# Patient Record
Sex: FEMALE
[De-identification: names, ages, dates, MRNs, and addresses within clinical notes are randomized per-mention and may not be internally consistent; named-entity substitution may affect disease eponyms.]

---

## 2020-02-11 PROBLEM — Z00.00 ENCOUNTER FOR PREVENTIVE HEALTH EXAMINATION: Status: ACTIVE | Noted: 2020-02-11

## 2020-02-18 ENCOUNTER — APPOINTMENT (OUTPATIENT)
Dept: HEART AND VASCULAR | Facility: CLINIC | Age: 75
End: 2020-02-18
Payer: MEDICARE

## 2020-02-18 VITALS
WEIGHT: 138 LBS | DIASTOLIC BLOOD PRESSURE: 60 MMHG | HEART RATE: 60 BPM | SYSTOLIC BLOOD PRESSURE: 112 MMHG | HEIGHT: 61 IN | BODY MASS INDEX: 26.06 KG/M2

## 2020-02-18 DIAGNOSIS — Z95.0 PRESENCE OF CARDIAC PACEMAKER: ICD-10-CM

## 2020-02-18 DIAGNOSIS — F17.200 NICOTINE DEPENDENCE, UNSPECIFIED, UNCOMPLICATED: ICD-10-CM

## 2020-02-18 DIAGNOSIS — Z82.3 FAMILY HISTORY OF STROKE: ICD-10-CM

## 2020-02-18 DIAGNOSIS — I49.5 SICK SINUS SYNDROME: ICD-10-CM

## 2020-02-18 PROCEDURE — 93280 PM DEVICE PROGR EVAL DUAL: CPT

## 2020-02-18 RX ORDER — HYDROCHLOROTHIAZIDE 25 MG/1
25 TABLET ORAL
Refills: 0 | Status: ACTIVE | COMMUNITY

## 2020-02-18 RX ORDER — METFORMIN HYDROCHLORIDE 625 MG/1
TABLET ORAL
Refills: 0 | Status: ACTIVE | COMMUNITY

## 2020-02-18 RX ORDER — ASPIRIN 81 MG
81 TABLET, DELAYED RELEASE (ENTERIC COATED) ORAL
Refills: 0 | Status: ACTIVE | COMMUNITY

## 2020-02-18 RX ORDER — ATENOLOL 50 MG/1
50 TABLET ORAL
Refills: 0 | Status: ACTIVE | COMMUNITY

## 2020-02-18 RX ORDER — ROSUVASTATIN CALCIUM 20 MG/1
20 TABLET, FILM COATED ORAL
Refills: 0 | Status: ACTIVE | COMMUNITY

## 2020-02-18 RX ORDER — LISINOPRIL 20 MG/1
20 TABLET ORAL
Refills: 0 | Status: ACTIVE | COMMUNITY

## 2020-02-19 PROBLEM — Z95.0 CARDIAC PACEMAKER: Status: ACTIVE | Noted: 2020-02-19

## 2020-02-19 NOTE — PHYSICAL EXAM
[Left Infraclavicular] : left infraclavicular area [Well-Healed] : well-healed [Clean] : clean [Dry] : dry

## 2020-02-19 NOTE — HISTORY OF PRESENT ILLNESS
[de-identified] : 75 y/o F with h/o WPW s/p ablation, SSS s/p PPM placement.  Prior EP care with Dr. Collins.  Presents to establish care today.  She offers no device related complaints.  No c/o rapid/irregular heart action.

## 2020-02-19 NOTE — PROCEDURE
[No] : not [Pacemaker] : pacemaker [DDD] : DDD [Sinus Bradycardia] : sinus bradycardia [Lead Imp:  ___ohms] : lead impedance was [unfilled] ohms [Sensing Amplitude ___mv] : sensing amplitude was [unfilled] mv [___V @] : [unfilled] V [___ ms] : [unfilled] ms [de-identified] : [facility] [de-identified] : 60 [de-identified] : jace [de-identified] : > 5 years [de-identified] : No events\par AP 70\par  0%

## 2020-08-20 PROBLEM — I49.5 SINUS NODE DYSFUNCTION: Status: ACTIVE | Noted: 2020-02-19
